# Patient Record
Sex: FEMALE | Race: WHITE | NOT HISPANIC OR LATINO | URBAN - METROPOLITAN AREA
[De-identification: names, ages, dates, MRNs, and addresses within clinical notes are randomized per-mention and may not be internally consistent; named-entity substitution may affect disease eponyms.]

---

## 2017-03-07 DIAGNOSIS — H53.40 VISUAL FIELD DEFECT: ICD-10-CM

## 2017-03-07 DIAGNOSIS — H47.10 PAPILLEDEMA: ICD-10-CM

## 2017-03-08 ENCOUNTER — OFFICE VISIT (OUTPATIENT)
Dept: OPHTHALMOLOGY | Facility: CLINIC | Age: 37
End: 2017-03-08
Attending: OPHTHALMOLOGY
Payer: COMMERCIAL

## 2017-03-08 DIAGNOSIS — G93.2 IDIOPATHIC INTRACRANIAL HYPERTENSION: Primary | ICD-10-CM

## 2017-03-08 DIAGNOSIS — H47.10 PAPILLEDEMA: ICD-10-CM

## 2017-03-08 PROCEDURE — 92083 EXTENDED VISUAL FIELD XM: CPT | Mod: ZF | Performed by: OPHTHALMOLOGY

## 2017-03-08 PROCEDURE — 99212 OFFICE O/P EST SF 10 MIN: CPT | Mod: ZF

## 2017-03-08 ASSESSMENT — VISUAL ACUITY
OS_CC: 20/20-
METHOD: SNELLEN - LINEAR
CORRECTION_TYPE: GLASSES
OD_CC: 20/20-

## 2017-03-08 ASSESSMENT — REFRACTION_WEARINGRX
OS_SPHERE: -5.50
OD_CYLINDER: +1.25
OS_AXIS: 100
OD_SPHERE: -3.75
OD_AXIS: 080
OS_CYLINDER: +2.50

## 2017-03-08 ASSESSMENT — EXTERNAL EXAM - RIGHT EYE: OD_EXAM: NORMAL

## 2017-03-08 ASSESSMENT — CONF VISUAL FIELD
OD_NORMAL: 1
OS_NORMAL: 1

## 2017-03-08 ASSESSMENT — EXTERNAL EXAM - LEFT EYE: OS_EXAM: NORMAL

## 2017-03-08 ASSESSMENT — SLIT LAMP EXAM - LIDS
COMMENTS: NORMAL
COMMENTS: NORMAL

## 2017-03-08 ASSESSMENT — TONOMETRY
OS_IOP_MMHG: 12
OD_IOP_MMHG: 15
IOP_METHOD: ICARE

## 2017-03-08 NOTE — NURSING NOTE
Chief Complaints and History of Present Illnesses   Patient presents with     Follow Up For     Pseudotumor cerebri with moderate optic disc edema     HPI    Affected eye(s):  Both   Symptoms:     No decreased vision      Frequency:  Constant       Do you have eye pain now?:  No      Comments:  No VA changes or new complaints today. Diamox as directed.     Licha LONG March 8, 2017 3:20 PM

## 2017-03-08 NOTE — MR AVS SNAPSHOT
After Visit Summary   3/8/2017    Cassia Mckeon    MRN: [id number]           Patient Information     Date Of Birth          1980        Visit Information        Provider Department      3/8/2017 3:00 PM Rusty Htuchins MD [facility]        Today's Diagnoses     Papilledema        Visual field defect           Follow-ups after your visit        Your next 10 appointments already scheduled     Aug 16, 2017  3:30 PM CDT   RETURN NEURO with Rusty Hutchins MD   Eye Clinic ([facility])    [address]  [address]  [address]  [facility] [address]   [phone]              Who to contact     Please call your clinic at [phone] to:    Ask questions about your health    Make or cancel appointments    Discuss your medicines    Learn about your test results    Speak to your doctor   If you have compliments or concerns about an experience at your clinic, or if you wish to file a complaint, please contact [facility] Patient Relations at [phone] or email us at [email or web address]         Additional Information About Your Visit        MyChart Information     OrangeS[employer]at gives you secure access to your electronic health record. If you see a primary care provider, you can also send messages to your care team and make appointments. If you have questions, please call your primary care clinic.  If you do not have a primary care provider, please call [phone] and they will assist you.      Oh[employer]apps is an electronic gateway that provides easy, online access to your medical records. With Oh[employer]apps, you can request a clinic appointment, read your test results, renew a prescription or communicate with your care team.     To access your existing account, please contact your [facility] or call [phone] for assistance.        Care EveryWhere ID     This is your Care EveryWhere ID. This  could be used by other organizations to access your [place] medical records  [id number]         Blood Pressure from Last 3 Encounters:   No data found for BP    Weight from Last 3 Encounters:   No data found for Wt              We Performed the Following     DILATED FUNDUS EXAM     Glaucoma Top OU     IOP Measurement        Primary Care Provider Office Phone # Fax #    Julian Meyer [phone]       [facility] MED  [address]  [facility] [address]        Thank you!     Thank you for choosing EYE CLINIC  for your care. Our goal is always to provide you with excellent care. Hearing back from our patients is one way we can continue to improve our services. Please take a few minutes to complete the written survey that you may receive in the mail after your visit with us. Thank you!             Your Updated Medication List - Protect others around you: Learn how to safely use, store and throw away your medicines at www.disposemymeds.org.          This list is accurate as of: 3/8/17  4:02 PM.  Always use your most recent med list.                   Brand Name Dispense Instructions for use    acetaZOLAMIDE 500 MG 12 hr capsule    DIAMOX SEQUEL     Take 500 mg by mouth 2 times daily       hydrochlorothiazide 25 MG tablet    HYDRODIURIL     Take 25 mg by mouth daily       norgestrel-ethinyl estradiol 0.3-30 MG-MCG per tablet    LO/OVRAL     Take 1 tablet by mouth daily       VITAMIN D3 PO      Take by mouth daily

## 2017-03-15 NOTE — PROGRESS NOTES
ATTENDING ASSESSMENT AND PLAN:     1. Pseudotumor cerebri:    - For a more thorough description of the disease presentation, please see   my letter from the patient's initial visit with me 1/7/16    - Symptoms of increased intracranial pressure has essentially resolved- headaches dramatically improved  - Continue weight loss (patient lost 50 lbs total now since diagnosis - beyond goal of 15%).  She is doing great in this regard.    -  Visual acuity and color vision normal in both eyes   -  Visual fields essentially full in both eyes today  -  Trace residual optic disc edema in both eyes versus remodeling changes  -  Stop Diamox    Return to clinic in 4 months       Complete documentation of historical and exam elements from today's encounter can be found in the full encounter summary report (not reduplicated in this progress note).  I personally obtained the chief complaint(s) and history of present illness.  I confirmed and edited as necessary the review of systems, past medical/surgical history, family history, social history, and examination findings as documented by others; and I examined the patient myself.  I personally reviewed the relevant tests, images, and reports as documented above.  I formulated and edited as necessary the assessment and plan and discussed the findings and management plan with the patient and family   Rusty Hutchins MD  8:20 PM  3/14/2017

## 2017-09-14 DIAGNOSIS — H47.10 PAPILLEDEMA: Primary | ICD-10-CM

## 2017-10-12 ENCOUNTER — OFFICE VISIT (OUTPATIENT)
Dept: OPHTHALMOLOGY | Facility: CLINIC | Age: 37
End: 2017-10-12
Attending: OPHTHALMOLOGY
Payer: COMMERCIAL

## 2017-10-12 DIAGNOSIS — G93.2 PSEUDOTUMOR CEREBRI: ICD-10-CM

## 2017-10-12 DIAGNOSIS — H47.11 PAPILLEDEMA ASSOCIATED WITH INCREASED INTRACRANIAL PRESSURE: Primary | ICD-10-CM

## 2017-10-12 PROCEDURE — 99212 OFFICE O/P EST SF 10 MIN: CPT | Mod: ZF

## 2017-10-12 RX ORDER — LISINOPRIL 10 MG/1
10 TABLET ORAL DAILY
COMMUNITY
Start: 2017-04-06

## 2017-10-12 ASSESSMENT — SLIT LAMP EXAM - LIDS
COMMENTS: NORMAL
COMMENTS: NORMAL

## 2017-10-12 ASSESSMENT — VISUAL ACUITY
METHOD: SNELLEN - LINEAR
OS_CC: 20/20
OD_CC: 20/15-2
OS_CC: 20/20
CORRECTION_TYPE: GLASSES
OD_CC: 20/20

## 2017-10-12 ASSESSMENT — EXTERNAL EXAM - RIGHT EYE: OD_EXAM: NORMAL

## 2017-10-12 ASSESSMENT — EXTERNAL EXAM - LEFT EYE: OS_EXAM: NORMAL

## 2017-10-12 ASSESSMENT — TONOMETRY
OS_IOP_MMHG: 16
OD_IOP_MMHG: 20
IOP_METHOD: TONOPEN

## 2017-10-12 NOTE — NURSING NOTE
Chief Complaints and History of Present Illnesses   Patient presents with     Papilledema Follow Up     HPI    Informant(s):  patient   Symptoms:     No blurred vision   No decreased vision   No distorted vision   No difficulty with reading   No difficulty watching television   No difficulty with driving   Able to meet visual needs of job   No double vision   No puffy eyes   No floaters   No flashes   No glare   No halos   No starbursts   No ghost images   No wandering eye   No crossed eye   No redness   No foreign body sensation   No tearing   No Dryness   No itching   No burning   Photophobia   No eye discharge      Frequency:  Weekly Other:  1 once a week - 1 every two weeks         Comments:  Slight increase in headaches (number of headaches); Better than what it was but slight increase in frequency compared to last visit; Feels that headaches are typically later in the day. Typically feel the headaches behind the eyes. Slight light sensitivity.   Weekly headaches; About once a week to once every two weeks, not daily.   Patient will manage headaches with ibuprofen

## 2017-10-12 NOTE — PROGRESS NOTES
ATTENDING ASSESSMENT AND PLAN:     1. Pseudotumor cerebri:    - For a more thorough description of the disease presentation, please see my letter from the patient's initial visit with me 1/7/16    - Symptoms of increased intracranial pressure had essentially resolved at last visit 3/8/17. Diamox was stopped at that time  - Since 8/2017, she notes increased frequency of headaches. The headaches occur about once a week, localize behind the eyes, and resolve within a few hours. Pulsatile tinnitus occurs 3-4 times per day and lasts only a few seconds. She denies TVOs  - Previously, she had significant weight loss (had lost 50 lbs since diagnosis), but she reports gaining over 15 lbs since 3/2017     -  Visual acuity and color vision normal in both eyes   - OCT retinal nerve fiber layer stable in both eyes  -  Trace residual optic disc edema in both eyes versus remodeling changes  -  We discussed restarting diamox versus monitoring with emphasis on weight loss. Given that she has no vision loss and is not particularly bothered by the headaches or pulsatile tinnitus, she decided to monitor without treatment.  She will place emphasis on weight loss.    Return to clinic in 4 months or sooner if necessary for visual changes, worsening pulsatile tinnitus / headaches.       Complete documentation of historical and exam elements from today's encounter can be found in the full encounter summary report (not reduplicated in this progress note).  I personally obtained the chief complaint(s) and history of present illness.  I confirmed and edited as necessary the review of systems, past medical/surgical history, family history, social history, and examination findings as documented by others; and I examined the patient myself.  I personally reviewed the relevant tests, images, and reports as documented above.  I formulated and edited as necessary the assessment and plan and discussed the findings and management plan with the patient  and family   Rusty Hutchins MD  8:20 PM  3/14/2017

## 2017-10-12 NOTE — MR AVS SNAPSHOT
After Visit Summary   10/12/2017    Cassia Mckeon    MRN: [id number]           Patient Information     Date Of Birth          1980        Visit Information        Provider Department      10/12/2017 3:00 PM Rusty Hutchins MD [facility]        Today's Diagnoses     Papilledema associated with increased intracranial pressure    -  1       Follow-ups after your visit        Your next 10 appointments already scheduled     Feb 22, 2018  3:00 PM CST   RETURN NEURO with Rusty Hutchins MD   Eye Clinic ([facility])    [address]  [address]  [address]  [facility] [address]   [phone]              Who to contact     Please call your clinic at [phone] to:    Ask questions about your health    Make or cancel appointments    Discuss your medicines    Learn about your test results    Speak to your doctor   If you have compliments or concerns about an experience at your clinic, or if you wish to file a complaint, please contact [facility] Patient Relations at [phone] or email us at [email or web address]         Additional Information About Your Visit        MyChart Information     S[employer] gives you secure access to your electronic health record. If you see a primary care provider, you can also send messages to your care team and make appointments. If you have questions, please call your primary care clinic.  If you do not have a primary care provider, please call [phone] and they will assist you.      S[employer]iet is an electronic gateway that provides easy, online access to your medical records. With [employer], you can request a clinic appointment, read your test results, renew a prescription or communicate with your care team.     To access your existing account, please contact your [facility] or call [phone] for assistance.        Care EveryWhere ID     This is  your Care EveryWhere ID. This could be used by other organizations to access your [place] medical records  [id number]         Blood Pressure from Last 3 Encounters:   No data found for BP    Weight from Last 3 Encounters:   No data found for Wt              Today, you had the following     No orders found for display       Primary Care Provider Office Phone # Fax #    Julian Meyer [phone]       [facility]  [address]  [facility] [address]        Equal Access to Services     ANGEL PEREZ : Hadii aad ku hadasho Soomaali, waaxda luqadaha, qaybta kaalmada adeegyada, waxay idiin hayaan adeeg shahanacatahugo la'jose . So [facility] [place] [facility] [phone].    ATENCIÓN: Si habla español, tiene a gaines disposición servicios gratuitos de asistencia lingüística. [facility] [phone].    We comply with applicable federal civil rights laws and [place] laws. We do not discriminate on the basis of race, color, national origin, age, disability, sex, sexual orientation, or gender identity.            Thank you!     Thank you for choosing EYE CLINIC  for your care. Our goal is always to provide you with excellent care. Hearing back from our patients is one way we can continue to improve our services. Please take a few minutes to complete the written survey that you may receive in the mail after your visit with us. Thank you!             Your Updated Medication List - Protect others around you: Learn how to safely use, store and throw away your medicines at www.disposemymeds.org.          This list is accurate as of: 10/12/17  4:42 PM.  Always use your most recent med list.                   Brand Name Dispense Instructions for use Diagnosis    lisinopril 10 MG tablet    PRINIVIL/ZESTRIL     Take 10 mg by mouth daily        norgestrel-ethinyl estradiol 0.3-30 MG-MCG per tablet    LO/OVRAL     Take 1 tablet by mouth daily        VITAMIN D3 PO      Take by mouth daily

## 2017-11-02 DIAGNOSIS — G93.2 PSEUDOTUMOR CEREBRI: Primary | ICD-10-CM

## 2017-11-02 RX ORDER — ACETAZOLAMIDE 500 MG/1
1000 CAPSULE, EXTENDED RELEASE ORAL 2 TIMES DAILY
Qty: 120 CAPSULE | Refills: 9 | Status: SHIPPED | OUTPATIENT
Start: 2017-11-02 | End: 2021-09-29

## 2017-12-31 ENCOUNTER — HEALTH MAINTENANCE LETTER (OUTPATIENT)
Age: 37
End: 2017-12-31

## 2018-04-02 DIAGNOSIS — H47.10 PAPILLEDEMA: Primary | ICD-10-CM

## 2018-04-04 ENCOUNTER — OFFICE VISIT (OUTPATIENT)
Dept: OPHTHALMOLOGY | Facility: CLINIC | Age: 38
End: 2018-04-04
Attending: OPHTHALMOLOGY
Payer: COMMERCIAL

## 2018-04-04 DIAGNOSIS — H47.10 PAPILLEDEMA: ICD-10-CM

## 2018-04-04 PROCEDURE — G0463 HOSPITAL OUTPT CLINIC VISIT: HCPCS | Mod: ZF | Performed by: TECHNICIAN/TECHNOLOGIST

## 2018-04-04 PROCEDURE — 92133 CPTRZD OPH DX IMG PST SGM ON: CPT | Mod: ZF | Performed by: OPHTHALMOLOGY

## 2018-04-04 ASSESSMENT — SLIT LAMP EXAM - LIDS
COMMENTS: NORMAL
COMMENTS: NORMAL

## 2018-04-04 ASSESSMENT — EXTERNAL EXAM - LEFT EYE: OS_EXAM: NORMAL

## 2018-04-04 ASSESSMENT — EXTERNAL EXAM - RIGHT EYE: OD_EXAM: NORMAL

## 2018-04-04 ASSESSMENT — CONF VISUAL FIELD
METHOD: COUNTING FINGERS
OS_NORMAL: 1
OD_NORMAL: 1

## 2018-04-04 ASSESSMENT — VISUAL ACUITY
METHOD: SNELLEN - LINEAR
OD_CC: 20/20
CORRECTION_TYPE: GLASSES
OS_CC+: -1
OS_CC: 20/20

## 2018-04-04 ASSESSMENT — TONOMETRY
OS_IOP_MMHG: 13
OD_IOP_MMHG: 15
IOP_METHOD: ICARE

## 2018-04-04 NOTE — MR AVS SNAPSHOT
After Visit Summary   4/4/2018    Cassia Mckeon    MRN: [id number]           Patient Information     Date Of Birth          1980        Visit Information        Provider Department      4/4/2018 3:30 PM Rusty uHtchins MD [facility] Clinic        Today's Diagnoses     Papilledema           Follow-ups after your visit        Your next 10 appointments already scheduled     Apr 04, 2018  3:30 PM CDT   RETURN NEURO with Rusty Hutchins MD   Eye Clinic ([facility])    [address] [address]   [phone]            Oct 10, 2018  2:30 PM CDT   RETURN NEURO with Rusty Hutchins MD   Eye Clinic ([facility])    [address] [address]   [phone]              Who to contact     Please call your clinic at [phone] to:    Ask questions about your health    Make or cancel appointments    Discuss your medicines    Learn about your test results    Speak to your doctor            Additional Information About Your Visit        MyChart Information     [employer] gives you secure access to your electronic health record. If you see a primary care provider, you can also send messages to your care team and make appointments. If you have questions, please call your primary care clinic.  If you do not have a primary care provider, please call [phone] and they will assist you.      Prim[employer]ista is an electronic gateway that provides easy, online access to your medical records. With [employer], you can request a clinic appointment, read your test results, renew a prescription or communicate with your care team.     To access your existing account, please contact your [facility] or call [phone] for assistance.        Care EveryWhere ID     This is your Care EveryWhere ID. This could be used by other  organizations to access your [place] medical records  [id number]         Blood Pressure from Last 3 Encounters:   No data found for BP    Weight from Last 3 Encounters:   No data found for Wt              We Performed the Following     DILATED FUNDUS EXAM     IOP Measurement     OCT Optic Nerve RNFL Spectralis OU (both eyes)        Primary Care Provider Office Phone # Fax #    Julian Meyer [phone]       [facility] MED  [address]  [facility] [address]        Equal Access to Services     ANGEL PEREZ : Hadii aad ku hadasho Soomaali, waaxda luqadaha, qaybta kaalmada adeegyada, waxay idiin hayaan adeeg kharash lacamila . So [facility] [phone].    ATENCIÓN: Si juana lyon, tiene a gaines disposición servicios gratuitos de asistencia lingüística. [facility] [phone].    We comply with applicable federal civil rights laws and [place] laws. We do not discriminate on the basis of race, color, national origin, age, disability, sex, sexual orientation, or gender identity.            Thank you!     Thank you for choosing EYE CLINIC  for your care. Our goal is always to provide you with excellent care. Hearing back from our patients is one way we can continue to improve our services. Please take a few minutes to complete the written survey that you may receive in the mail after your visit with us. Thank you!             Your Updated Medication List - Protect others around you: Learn how to safely use, store and throw away your medicines at www.disposemymeds.org.          This list is accurate as of 4/4/18  3:08 PM.  Always use your most recent med list.                   Brand Name Dispense Instructions for use Diagnosis    acetaZOLAMIDE 500 MG 12 hr capsule    DIAMOX SEQUEL    120 capsule    Take 2 capsules (1,000 mg) by mouth 2 times daily    Pseudotumor cerebri       lisinopril 10 MG tablet    PRINIVIL/ZESTRIL     Take 10 mg by mouth daily        norgestrel-ethinyl estradiol 0.3-30  MG-MCG per tablet    LO/OVRAL     Take 1 tablet by mouth daily        VITAMIN D3 PO      Take by mouth daily

## 2018-04-04 NOTE — NURSING NOTE
Chief Complaints and History of Present Illnesses   Patient presents with     Follow Up For     IIH     HPI    Symptoms:              Comments:  6 months follow up for Pseudotumor cerebri.    Patient currently on Diamox, 500 mg BID.  Have not had much headaches per patient.   Stable vision per patient.   Patient lost about 11 lbs since last eye exam.   EMERALD Anne 4/4/2018 2:37 PM

## 2018-04-05 NOTE — PROGRESS NOTES
1. Pseudotumor cerebri (very mild versus inactive currently)    - For a more thorough description of the disease presentation, please see my letter from the patient's initial visit with me 1/7/16    -Patient has not had any symptoms of increased intracranial pressure since last visit (October 2017).  At that time she was having more headaches which prompted us to put her back on Diamox 500 twice a day.    -Today she denies any significant headaches.  She reports that she has lost about 11 pounds since her last eye examination.    -Visual acuity and color vision are normal in both eyes today.    -Dilated fundus examination shows mild nasal elevation in both eyes with possible trace active edema versus chronic gliotic changes of the nerve head.     OCT of the optic nerve head today revealed stable retinal nerve fiber layer mild atrophy in the left eye.  Slightly decreased retinal nerve fiber layer in the right eye probably indicating mild improvement in papilledema since last visit.    In conclusion this patient no longer has symptoms of increased intracranial pressure.  She has no significant vision loss from her disease.  She either has inactive pseudotumor cerebri or very mild papilledema.  She has lost considerable weight since the time of diagnosis.    After discussions we have mutually opted to stop her Diamox.  We will watch her carefully.  If she has a recurrence of symptoms of increased intracranial pressure then we may need to restart.    Follow-up in 6 months or sooner as needed.         Complete documentation of historical and exam elements from today's encounter can be found in the full encounter summary report (not reduplicated in this progress note).  I personally obtained the chief complaint(s) and history of present illness.  I confirmed and edited as necessary the review of systems, past medical/surgical history, family history, social history, and examination findings as documented by others;  and I examined the patient myself.  I personally reviewed the relevant tests, images, and reports as documented above.  I formulated and edited as necessary the assessment and plan and discussed the findings and management plan with the patient and family     Rusty Hutchins MD

## 2020-03-10 ENCOUNTER — HEALTH MAINTENANCE LETTER (OUTPATIENT)
Age: 40
End: 2020-03-10

## 2020-04-01 ENCOUNTER — TELEPHONE (OUTPATIENT)
Dept: OPHTHALMOLOGY | Facility: CLINIC | Age: 40
End: 2020-04-01

## 2020-12-27 ENCOUNTER — HEALTH MAINTENANCE LETTER (OUTPATIENT)
Age: 40
End: 2020-12-27

## 2021-04-24 ENCOUNTER — HEALTH MAINTENANCE LETTER (OUTPATIENT)
Age: 41
End: 2021-04-24

## 2021-08-23 ENCOUNTER — MYC MEDICAL ADVICE (OUTPATIENT)
Dept: OPHTHALMOLOGY | Facility: CLINIC | Age: 41
End: 2021-08-23

## 2021-08-24 ENCOUNTER — TRANSCRIBE ORDERS (OUTPATIENT)
Dept: OTHER | Age: 41
End: 2021-08-24

## 2021-08-24 DIAGNOSIS — R51.9 NONINTRACTABLE HEADACHE, UNSPECIFIED CHRONICITY PATTERN, UNSPECIFIED HEADACHE TYPE: ICD-10-CM

## 2021-08-24 DIAGNOSIS — H53.9 VISION CHANGES: Primary | ICD-10-CM

## 2021-09-07 NOTE — TELEPHONE ENCOUNTER
Confirmed appt is on September 29th at 1:30 PM with Dr. Hernandez-- I likely had typo in [employer] message.    Pt seemed satisfied with information and seemed comfortable with scheduling    Orlando Friedman RN 4:39 PM 09/07/21

## 2021-09-29 ENCOUNTER — MYC MEDICAL ADVICE (OUTPATIENT)
Dept: OPHTHALMOLOGY | Facility: CLINIC | Age: 41
End: 2021-09-29

## 2021-09-29 ENCOUNTER — OFFICE VISIT (OUTPATIENT)
Dept: OPHTHALMOLOGY | Facility: CLINIC | Age: 41
End: 2021-09-29
Attending: OPHTHALMOLOGY
Payer: COMMERCIAL

## 2021-09-29 DIAGNOSIS — H47.10 PAPILLEDEMA: Primary | ICD-10-CM

## 2021-09-29 DIAGNOSIS — R51.9 NONINTRACTABLE HEADACHE, UNSPECIFIED CHRONICITY PATTERN, UNSPECIFIED HEADACHE TYPE: ICD-10-CM

## 2021-09-29 DIAGNOSIS — H53.9 VISION CHANGES: ICD-10-CM

## 2021-09-29 PROBLEM — I10 HYPERTENSION: Status: ACTIVE | Noted: 2021-09-29

## 2021-09-29 PROBLEM — E66.01 MORBID OBESITY (H): Status: ACTIVE | Noted: 2021-09-29

## 2021-09-29 PROBLEM — E55.9 VITAMIN D DEFICIENCY: Status: ACTIVE | Noted: 2021-09-29

## 2021-09-29 PROCEDURE — G0463 HOSPITAL OUTPT CLINIC VISIT: HCPCS

## 2021-09-29 PROCEDURE — 92004 COMPRE OPH EXAM NEW PT 1/>: CPT | Mod: GC | Performed by: OPHTHALMOLOGY

## 2021-09-29 PROCEDURE — 92083 EXTENDED VISUAL FIELD XM: CPT | Performed by: OPHTHALMOLOGY

## 2021-09-29 PROCEDURE — 92133 CPTRZD OPH DX IMG PST SGM ON: CPT | Performed by: OPHTHALMOLOGY

## 2021-09-29 RX ORDER — CYCLOBENZAPRINE HCL 5 MG
5 TABLET ORAL PRN
COMMUNITY
Start: 2021-09-02

## 2021-09-29 RX ORDER — ACETAZOLAMIDE 500 MG/1
1000 CAPSULE, EXTENDED RELEASE ORAL 2 TIMES DAILY
Qty: 120 CAPSULE | Refills: 4 | Status: SHIPPED | OUTPATIENT
Start: 2021-09-29 | End: 2022-03-03

## 2021-09-29 RX ORDER — DOXYCYCLINE 40 MG/1
CAPSULE ORAL
COMMUNITY
Start: 2020-06-25 | End: 2021-12-01

## 2021-09-29 ASSESSMENT — REFRACTION_WEARINGRX
OS_CYLINDER: +2.50
OD_SPHERE: -3.75
OD_CYLINDER: +1.25
OS_AXIS: 100
OD_AXIS: 080
OS_SPHERE: -5.50

## 2021-09-29 ASSESSMENT — TONOMETRY
OS_IOP_MMHG: 12
IOP_METHOD: ICARE
OD_IOP_MMHG: 16

## 2021-09-29 ASSESSMENT — VISUAL ACUITY
OD_SC: 20/20
METHOD: SNELLEN - LINEAR
OS_SC: 20/20

## 2021-09-29 ASSESSMENT — EXTERNAL EXAM - RIGHT EYE: OD_EXAM: NORMAL

## 2021-09-29 ASSESSMENT — SLIT LAMP EXAM - LIDS
COMMENTS: NORMAL
COMMENTS: NORMAL

## 2021-09-29 ASSESSMENT — CONF VISUAL FIELD
OD_NORMAL: 1
OS_NORMAL: 1

## 2021-09-29 ASSESSMENT — EXTERNAL EXAM - LEFT EYE: OS_EXAM: NORMAL

## 2021-09-29 NOTE — LETTER
2021    RE: Cassia Mckeon  : 1980  MRN: [id number]    Dear Providers,    I saw our mutual patient, Cassia Mckeon, in follow-up in my clinic recently.  After a thorough neuro-ophthalmic history and examination, I came to the following conclusions:     1. Pseudotumor cerebri with recurrence of symptoms.  Risk factors for recurrence include doxycycline for blepharitis over the past 18 months as well as a self-reported weight gain of 30 pounds in the past 3 years.  Today with mild to moderate disc edema without visual deficits.     Will resume diamox 1000mg BID as the patient tolerated acetazolamide well in the past and recommend stopping the doxycycline and not resuming high risk medications associated with psuedotumor cerebri.     Current weight 260 lbs.      Return to clinic in 2 months or sooner as needed for worsening symptoms of vision or worsening headaches    INTERVAL HISTORY Last seen 2018    Cassia Mckeon is back today with recurrence of symptoms concerning for IIH.  She has had recurrence of her pulsatile tinnitus that she first noticed this summer. She notices this every once and a while. The frequency and intensity has been stable since recurrence. She also feels pressure in her head, and this is also similar to before. Has also had recurrence of headaches that also feel similar to her original presentation. These are worse later on in the day. These are happening a few times a week.  These headaches are improved with sleeping or napping. With regard to her vision, she feels is seeing black circles in her vision when she goes up stairs or with increased exertional activity.     With regard to identifying triggers, she has gained about 30lbs in the past three years and for rosacea was started on Minocycline about 18 months ago. This she tried for about 2 weeks before she felt like her pressure symptoms at that time were returning so she stopped and switched to daily  doxycycline 50 mg. She is still taking the daily doxycycline, she feels that it is helping her flare ups, though has only had a mild improvement in her skin redness.     She was last seen in 2018, she had very mild IIH and managed to be taken off diamox dosing with directions to return in 6 months however patent did not follow up. She does live in [facility] [facility].     Currently not taking any caffeine.      Visual acuity with correction was 20/20 in the right eye and 20/20 in the left eye. IOP was 16 in the right eye and 12 in the left eye. Confrontational Visual fields were full in both eyes. Ocular motility was full in all gaze directions. Color plates were 11/11 in the right eye and 11/11 in the left eye.  Pupils were equal, round and reactive to light with no RAPD.     Dilated fundus exam: Increase in disc edema from prior visit, Grade 1-2 right eye grade 1 left eye.     Tests ordered and interpreted today:  OCT rNFL demonstrated a mean NFL thickness of 100 in the right eye and 85 in the left eye. Thickness profile demonstrated diffuse thickening in the right eye and mild thickening in the left eye.    VF: Octopus visual field was performed. Test was reliable.. Right eye with mild scattered deficits.. Left eye with mild scattered deficits.      For further exam details, please feel free to contact our office for additional records.  If you wish to contact me regarding this patient please email me at [email or web address] or give my clinic a call to arrange a phone conversation.    Sincerely,    Rusty Hutchins MD  , Neuro-Ophthalmology and Adult Strabismus Surgery  The San[facility] Hand Chair in Neuro-Ophthalmology  Department of Ophthalmology and Visual Neurosciences  [facility]

## 2021-09-29 NOTE — PROGRESS NOTES
1. Pseudotumor cerebri with recurrence of symptoms.  Risk factors for recurrence include doxycycline for blepharitis over the past 18 months as well as a self-reported weight gain of 30 pounds in the past 3 years.  Today with mild to moderate disc edema without visual deficits.     Will resume diamox 1000mg BID as the patient tolerated acetazolamide well in the past and recommend stopping the doxycycline and not resuming high risk medications associated with psuedotumor cerebri.     Current weight 260 lbs.      Return to clinic in 2 months or sooner as needed for worsening symptoms of vision or worsening headaches    INTERVAL HISTORY Last seen 4/4/2018    Cassia Mckeon is back today with recurrence of symptoms concerning for IIH.  She has had recurrence of her pulsatile tinnitus that she first noticed this summer. She notices this every once and a while. The frequency and intensity has been stable since recurrence. She also feels pressure in her head, and this is also similar to before. Has also had recurrence of headaches that also feel similar to her original presentation. These are worse later on in the day. These are happening a few times a week.  These headaches are improved with sleeping or napping. With regard to her vision, she feels is seeing black circles in her vision when she goes up stairs or with increased exertional activity.     With regard to identifying triggers, she has gained about 30lbs in the past three years and for rosacea was started on Minocycline about 18 months ago. This she tried for about 2 weeks before she felt like her pressure symptoms at that time were returning so she stopped and switched to daily doxycycline 50 mg. She is still taking the daily doxycycline, she feels that it is helping her flare ups, though has only had a mild improvement in her skin redness.     She was last seen in 2018, she had very mild IIH and managed to be taken off diamox dosing with directions to  return in 6 months however patent did not follow up. She does live in [facility].     Currently not taking any caffeine.      Visual acuity with correction was 20/20 in the right eye and 20/20 in the left eye. IOP was 16 in the right eye and 12 in the left eye. Confrontational Visual fields were full in both eyes. Ocular motility was full in all gaze directions. Color plates were 11/11 in the right eye and 11/11 in the left eye.  Pupils were equal, round and reactive to light with no RAPD.     Dilated fundus exam: Increase in disc edema from prior visit, Grade 1-2 right eye grade 1 left eye.     Tests ordered and interpreted today:  OCT rNFL demonstrated a mean NFL thickness of 100 in the right eye and 85 in the left eye. Thickness profile demonstrated diffuse thickening in the right eye and mild thickening in the left eye.    VF: Octopus visual field was performed. Test was reliable.. Right eye with mild scattered deficits.. Left eye with mild scattered deficits.     Complete documentation of historical and exam elements from today's encounter can be found in the full encounter summary report (not reduplicated in this progress note).  I personally obtained the chief complaint(s) and history of present illness.  I confirmed and edited as necessary the review of systems, past medical/surgical history, family history, social history, and examination findings as documented by others; and I examined the patient myself.  I personally reviewed the relevant tests, images, and reports as documented above.  I formulated and edited as necessary the assessment and plan and discussed the findings and management plan with the patient and family.  I personally reviewed the ophthalmic test(s) associated with this encounter, agree with the interpretation(s) as documented by the resident/fellow, and have edited the corresponding report(s) as necessary.     MD Cesilia Escobar MD (PGY3 ophthalmology  resident)

## 2021-09-29 NOTE — NURSING NOTE
Chief Complaints and History of Present Illnesses   Patient presents with     Blurred Vision Follow-Up     Chief Complaint(s) and History of Present Illness(es)     Blurred Vision Follow-Up               Comments     Casisa Mckeon is a 40 year old female who presents today for  1. Pseudotumor cerebri (very mild versus inactive currently)  Patient is complaining of tinnitus and headaches. Sees black spots when going up the stairs, and episodes of dizziness.   No diplopia    Santiago CORBIN 1:33 PM September 29, 2021

## 2021-09-30 DIAGNOSIS — H47.10 PAPILLEDEMA: Primary | ICD-10-CM

## 2021-09-30 NOTE — PROGRESS NOTES
Spoke to patient at 5:38 PM. She stated that she was able to have the original pharmacy send the prescription to the new pharmacy of her choice. Patient did not have any additional questions or concerns.     Jesica King MD  Resident Physician, PGY-2  Department of Ophthalmology  09/30/21 5:38 PM

## 2021-10-04 ENCOUNTER — HEALTH MAINTENANCE LETTER (OUTPATIENT)
Age: 41
End: 2021-10-04

## 2021-12-01 ENCOUNTER — OFFICE VISIT (OUTPATIENT)
Dept: OPHTHALMOLOGY | Facility: CLINIC | Age: 41
End: 2021-12-01
Attending: OPHTHALMOLOGY
Payer: COMMERCIAL

## 2021-12-01 DIAGNOSIS — H47.10 PAPILLEDEMA: ICD-10-CM

## 2021-12-01 DIAGNOSIS — H47.10 PAPILLEDEMA: Primary | ICD-10-CM

## 2021-12-01 DIAGNOSIS — H47.20 PARTIAL OPTIC ATROPHY: ICD-10-CM

## 2021-12-01 PROCEDURE — 92133 CPTRZD OPH DX IMG PST SGM ON: CPT | Performed by: OPHTHALMOLOGY

## 2021-12-01 PROCEDURE — G0463 HOSPITAL OUTPT CLINIC VISIT: HCPCS

## 2021-12-01 PROCEDURE — 92014 COMPRE OPH EXAM EST PT 1/>: CPT | Mod: GC | Performed by: OPHTHALMOLOGY

## 2021-12-01 PROCEDURE — 92083 EXTENDED VISUAL FIELD XM: CPT | Performed by: OPHTHALMOLOGY

## 2021-12-01 ASSESSMENT — REFRACTION_WEARINGRX
OD_AXIS: 080
OD_CYLINDER: +1.25
SPECS_TYPE: SVL
OD_SPHERE: -3.75
OS_AXIS: 100
OS_CYLINDER: +2.50
OS_SPHERE: -5.50

## 2021-12-01 ASSESSMENT — SLIT LAMP EXAM - LIDS
COMMENTS: NORMAL
COMMENTS: NORMAL

## 2021-12-01 ASSESSMENT — VISUAL ACUITY
OS_CC: 20/20
CORRECTION_TYPE: GLASSES
OD_CC: 20/20
OS_CC+: -2
METHOD: SNELLEN - LINEAR
OD_CC+: -1

## 2021-12-01 ASSESSMENT — CONF VISUAL FIELD
OD_NORMAL: 1
METHOD: COUNTING FINGERS
OS_NORMAL: 1

## 2021-12-01 ASSESSMENT — TONOMETRY
IOP_METHOD: TONOPEN
OS_IOP_MMHG: 13
OD_IOP_MMHG: 12

## 2021-12-01 ASSESSMENT — EXTERNAL EXAM - LEFT EYE: OS_EXAM: NORMAL

## 2021-12-01 ASSESSMENT — EXTERNAL EXAM - RIGHT EYE: OD_EXAM: NORMAL

## 2021-12-01 NOTE — NURSING NOTE
Chief Complaints and History of Present Illnesses   Patient presents with     Papilledema Follow Up     Chief Complaint(s) and History of Present Illness(es)     Papilledema Follow Up     Laterality: both eyes    Frequency: constantly    Timing: throughout the day    Course: gradually improving    Associated symptoms: Negative for eye pain, tearing, burning, itching, redness and photophobia    Pain scale: 0/10              Comments     'Notes much less pressure sensation in my head.'  Diamox 1000 mg BID.    Nyla Cordoba, COT COT 12:20 PM 12/01/2021

## 2021-12-01 NOTE — PROGRESS NOTES
1. Pseudotumor cerebri - Recurrence of IIH noted in 9/2021 likely from a combination of weight gain and tetracycline derivative antibiotic use. She has noticed subjective improvement in her IIH related symptoms including headaches and pulsatile tinnitus. Tolerating Diamox well at current dose of 1000 mg BID.      Current weight 249 lbs down from 260 lbs at diagnosis. OCT RNFL is improved in the right eye and similar in the left eye. Right eye is currently 93 um. Previous jessika 4/2018 of 86.     Maintain dose at 1000 mg BID and follow-up in 3-4 months. Continue efforts at weight loss and avoid precipitating medications.    Historical Data from prior visits:  Cassia Mckeon is back today with recurrence of symptoms concerning for IIH.  She has had recurrence of her pulsatile tinnitus that she first noticed this summer. She notices this every once and a while. The frequency and intensity has been stable since recurrence. She also feels pressure in her head, and this is also similar to before. Has also had recurrence of headaches that also feel similar to her original presentation. These are worse later on in the day. These are happening a few times a week.  These headaches are improved with sleeping or napping. With regard to her vision, she feels is seeing black circles in her vision when she goes up stairs or with increased exertional activity.     With regard to identifying triggers, she has gained about 30lbs in the past three years and for rosacea was started on Minocycline about 18 months ago. This she tried for about 2 weeks before she felt like her pressure symptoms at that time were returning so she stopped and switched to daily doxycycline 50 mg. She is still taking the daily doxycycline, she feels that it is helping her flare ups, though has only had a mild improvement in her skin redness.     She was last seen in 2018, she had very mild IIH and managed to be taken off diamox dosing with directions to  return in 6 months however patent did not follow up. She does live in [facility].     Currently not taking any caffeine.      INTERVAL HISTORY Last seen 9/29/21  Since starting Diamox, she notes resolution of headaches and pulsatile tinnitus. Denies double vision, TVOs. She stopped doxycycline after last visit.     She has lost 10 lbs with weight watchers and portion control.   Taking Diamox 1000 mg twice a day without significant problem.     Exam:  Visual acuity with correction was 20/20 in the right eye and 20/20 in the left eye. IOP was 16 in the right eye and 12 in the left eye. Confrontational Visual fields were full in both eyes. Ocular motility was full in all gaze directions. Color plates were 11/11 in the right eye and 11/11 in the left eye.  Pupils were equal, round and reactive to light with no RAPD.     Dilated fundus exam: Improved that optic disc edema in the right eye and trace edema in the left eye    Tests ordered and interpreted today:  OCT rNFL demonstrated a mean NFL thickness of 93 in the right eye and 82 in the left eye. Thickness profile demonstrated diffuse thickening in the right eye and mild thickening in the left eye.  Retinal nerve fiber layer thickness was significant improved in the right eye since last visit and unchanged in the left eye    Octopus automated 30 degree visual field. Test was reliable..  Both visual fields showed mild scattered points of depression but were mostly full.  Both visual fields were improved from September 2021.     Complete documentation of historical and exam elements from today's encounter can be found in the full encounter summary report (not reduplicated in this progress note).  I personally obtained the chief complaint(s) and history of present illness.  I confirmed and edited as necessary the review of systems, past medical/surgical history, family history, social history, and examination findings as documented by others; and I examined the  patient myself.  I personally reviewed the relevant tests, images, and reports as documented above.  I formulated and edited as necessary the assessment and plan and discussed the findings and management plan with the patient and family.  I personally reviewed the ophthalmic test(s) associated with this encounter, agree with the interpretation(s) as documented by the resident/fellow, and have edited the corresponding report(s) as necessary.     MD Lavell Escobar, DO- Neuro-ophthalmology Fellow

## 2022-03-03 DIAGNOSIS — H47.10 PAPILLEDEMA: ICD-10-CM

## 2022-03-03 RX ORDER — ACETAZOLAMIDE 500 MG/1
1000 CAPSULE, EXTENDED RELEASE ORAL 2 TIMES DAILY
Qty: 120 CAPSULE | Refills: 4 | Status: SHIPPED | OUTPATIENT
Start: 2022-03-03 | End: 2022-10-13

## 2022-03-03 NOTE — TELEPHONE ENCOUNTER
acetaZOLAMIDE (DIAMOX SEQUEL) 500 MG 12 hr capsule  Last Written Prescription Date:   9/29/2021  Last Fill Quantity: 120,   # refills: 4  Last Office Visit : 12/1/2021  Future Office visit:  3/9/2022     Rusty Hutchins MD    Ophthalmology  Assessment & Plan         1. Pseudotumor cerebri - Recurrence of IIH noted in 9/2021 likely from a combination of weight gain and tetracycline derivative antibiotic use. She has noticed subjective improvement in her IIH related symptoms including headaches and pulsatile tinnitus. Tolerating Diamox well at current dose of 1000 mg BID.      Current weight 249 lbs down from 260 lbs at diagnosis. OCT RNFL is improved in the right eye and similar in the left eye. Right eye is currently 93 um. Previous jessika 4/2018 of 86.      Maintain dose at 1000 mg BID and follow-up in 3-4 months. Continue efforts at weight loss and avoid precipitating medications.    120 Capsules, 4 Refills sent to pharm 3/3/2022      Odessa Wright RN  Central Triage Red Flags/Med Refills        Warnings Override History for acetaZOLAMIDE (DIAMOX SEQUEL) 500 MG 12 hr capsule [[id number]]    Overridden by Rusty Hutchins MD on Sep 29, 2021 3:23 PM   Dose   1. ACETAZOLAMIDE, 1,000 MG, ORAL, 2 TIMES DAILY  DAILY DOSE 2,000 MG. OVERDOSE (MAX. 1,000 MG);   SINGLE DOSE 1,000 MG. OVERDOSE (MAX. 500 MG) [Reason: Benefit outweighs risk]

## 2022-03-09 ENCOUNTER — OFFICE VISIT (OUTPATIENT)
Dept: OPHTHALMOLOGY | Facility: CLINIC | Age: 42
End: 2022-03-09
Attending: OPHTHALMOLOGY
Payer: COMMERCIAL

## 2022-03-09 DIAGNOSIS — H47.10 PAPILLEDEMA: Primary | ICD-10-CM

## 2022-03-09 PROCEDURE — G0463 HOSPITAL OUTPT CLINIC VISIT: HCPCS | Performed by: TECHNICIAN/TECHNOLOGIST

## 2022-03-09 PROCEDURE — 92133 CPTRZD OPH DX IMG PST SGM ON: CPT | Performed by: OPHTHALMOLOGY

## 2022-03-09 PROCEDURE — 92014 COMPRE OPH EXAM EST PT 1/>: CPT | Performed by: OPHTHALMOLOGY

## 2022-03-09 ASSESSMENT — TONOMETRY
OS_IOP_MMHG: 15
OD_IOP_MMHG: 13
IOP_METHOD: ICARE

## 2022-03-09 ASSESSMENT — REFRACTION_WEARINGRX
SPECS_TYPE: SVL
OS_CYLINDER: +2.50
OD_CYLINDER: +1.25
OD_SPHERE: -3.75
OD_AXIS: 080
OS_AXIS: 100
OS_SPHERE: -5.50

## 2022-03-09 ASSESSMENT — CONF VISUAL FIELD
OD_NORMAL: 1
METHOD: COUNTING FINGERS
OS_NORMAL: 1

## 2022-03-09 ASSESSMENT — SLIT LAMP EXAM - LIDS
COMMENTS: NORMAL
COMMENTS: NORMAL

## 2022-03-09 ASSESSMENT — VISUAL ACUITY
OS_CC: 20/20
CORRECTION_TYPE: GLASSES
METHOD: SNELLEN - LINEAR
OD_CC: 20/20

## 2022-03-09 ASSESSMENT — EXTERNAL EXAM - RIGHT EYE: OD_EXAM: NORMAL

## 2022-03-09 ASSESSMENT — EXTERNAL EXAM - LEFT EYE: OS_EXAM: NORMAL

## 2022-03-09 NOTE — PROGRESS NOTES
1. Pseudotumor cerebri - Recurrence of IIH noted in 9/2021 likely from a combination of weight gain and tetracycline derivative antibiotic use. She has noticed subjective improvement in her IIH related symptoms including headaches and pulsatile tinnitus. Tolerating Diamox well at current dose of 1000 mg BID.      Current weight 229 lbs down from 260 lbs at diagnosis. OCT RNFL is improved again in the right eye and similar in the left eye.  Right eye retinal nerve fiber layer average approaching jessika 4/2018 of 86.     Plan: Decrease Diamox to 500 mg twice a day.     Historical Data from prior visits:  Cassia Mckeon is back today with recurrence of symptoms concerning for   IIH.  She has had recurrence of her pulsatile tinnitus that she first noticed   this summer. She notices this every once and a while. The frequency and   intensity has been stable since recurrence. She also feels pressure in her   head, and this is also similar to before. Has also had recurrence of   headaches that also feel similar to her original presentation. These are   worse later on in the day. These are happening a few times a week.  These   headaches are improved with sleeping or napping. With regard to her   vision, she feels is seeing black circles in her vision when she goes up   stairs or with increased exertional activity.     With regard to identifying triggers, she has gained about 30lbs in the   past three years and for rosacea was started on Minocycline about 18   months ago. This she tried for about 2 weeks before she felt like her   pressure symptoms at that time were returning so she stopped and switched   to daily doxycycline 50 mg. She is still taking the daily doxycycline, she   feels that it is helping her flare ups, though has only had a mild   improvement in her skin redness.     She was last seen in 2018, she had very mild IIH and managed to be taken   off diamox dosing with directions to return in 6 months  however patent did   not follow up. She does live in [facility].     Currently not taking any caffeine.      INTERVAL HISTORY Last seen 9/29/21  Since starting Diamox, she notes resolution of headaches and pulsatile   tinnitus. Denies double vision, TVOs. She stopped doxycycline after last   visit.     She has lost 10 lbs with weight watchers and portion control.   Taking   Diamox 1000 mg twice a day without significant problem.     Exam:  Visual acuity with correction was 20/20 in the right eye and 20/20 in the   left eye. IOP was 16 in the right eye and 12 in the left eye.   Confrontational Visual fields were full in both eyes. Ocular motility was   full in all gaze directions. Color plates were 11/11 in the right eye and   11/11 in the left eye.  Pupils were equal, round and reactive to light   with no RAPD.     Dilated fundus exam: Improved that optic disc edema in the right eye and   trace edema in the left eye    Tests ordered and interpreted today:  OCT rNFL demonstrated a mean NFL thickness of 93 in the right eye and 82   in the left eye. Thickness profile demonstrated diffuse thickening in the   right eye and mild thickening in the left eye.  Retinal nerve fiber layer   thickness was significant improved in the right eye since last visit and   unchanged in the left eye    Octopus automated 30 degree visual field. Test was reliable..  Both visual   fields showed mild scattered points of depression but were mostly full.    Both visual fields were improved from September 2021.    Interim history and exam with me since last visit 12/1/2021     Since her last visit she has been taking 1000 mg twice a day of Diamox.  She reports 1 headache every several weeks.  She does continue to have pulsatile tinnitus.  Of note the pulsatile tinnitus did resolve in the past when her papilledema was also resolved.  She does note that her whooshing has improved significantly from the fall 2021 upon restarting  Diamox.    She is currently at around 229 to 230 pounds with a significant additional 10 pounds of weight loss since last visit.  At the term time of her recurrence her reported weight was 260 pounds.    OCT of the optic nerve head revealed the average retinal nerve fiber layer thickness in the right eye was down another 4  m in close to her jessika.  The left eye does not fluctuate much with her pseudotumor activity and is essentially stable in terms of retinal nerve fiber layer thickness.         Complete documentation of historical and exam elements from today's encounter can be found in the full encounter summary report (not reduplicated in this progress note).  I personally obtained the chief complaint(s) and history of present illness.  I confirmed and edited as necessary the review of systems, past medical/surgical history, family history, social history, and examination findings as documented by others; and I examined the patient myself.  I personally reviewed the relevant tests, images, and reports as documented above.  I formulated and edited as necessary the assessment and plan and discussed the findings and management plan with the patient and family     Rusty Hutchins MD

## 2022-05-15 ENCOUNTER — HEALTH MAINTENANCE LETTER (OUTPATIENT)
Age: 42
End: 2022-05-15

## 2022-09-11 ENCOUNTER — HEALTH MAINTENANCE LETTER (OUTPATIENT)
Age: 42
End: 2022-09-11

## 2022-10-13 ENCOUNTER — OFFICE VISIT (OUTPATIENT)
Dept: OPHTHALMOLOGY | Facility: CLINIC | Age: 42
End: 2022-10-13
Attending: OPHTHALMOLOGY
Payer: COMMERCIAL

## 2022-10-13 DIAGNOSIS — H47.10 PAPILLEDEMA: Primary | ICD-10-CM

## 2022-10-13 PROCEDURE — G0463 HOSPITAL OUTPT CLINIC VISIT: HCPCS | Mod: 25

## 2022-10-13 PROCEDURE — 92014 COMPRE OPH EXAM EST PT 1/>: CPT | Mod: GC | Performed by: OPHTHALMOLOGY

## 2022-10-13 PROCEDURE — 92133 CPTRZD OPH DX IMG PST SGM ON: CPT | Performed by: OPHTHALMOLOGY

## 2022-10-13 RX ORDER — ACETAZOLAMIDE 500 MG/1
500 CAPSULE, EXTENDED RELEASE ORAL DAILY
Qty: 150 CAPSULE | Refills: 1 | Status: SHIPPED | OUTPATIENT
Start: 2022-10-13

## 2022-10-13 ASSESSMENT — VISUAL ACUITY
CORRECTION_TYPE: GLASSES
OD_CC+: -1
OS_CC+: -1
METHOD: SNELLEN - LINEAR
OD_CC: 20/20
OS_CC: 20/20

## 2022-10-13 ASSESSMENT — CONF VISUAL FIELD
OD_SUPERIOR_TEMPORAL_RESTRICTION: 0
OS_INFERIOR_TEMPORAL_RESTRICTION: 0
OD_INFERIOR_TEMPORAL_RESTRICTION: 0
OD_SUPERIOR_NASAL_RESTRICTION: 0
OS_SUPERIOR_TEMPORAL_RESTRICTION: 0
OD_NORMAL: 1
OS_INFERIOR_NASAL_RESTRICTION: 0
OS_NORMAL: 1
OS_SUPERIOR_NASAL_RESTRICTION: 0
OD_INFERIOR_NASAL_RESTRICTION: 0

## 2022-10-13 ASSESSMENT — EXTERNAL EXAM - LEFT EYE: OS_EXAM: NORMAL

## 2022-10-13 ASSESSMENT — TONOMETRY
OD_IOP_MMHG: 16
IOP_METHOD: TONOPEN
OS_IOP_MMHG: 16

## 2022-10-13 ASSESSMENT — EXTERNAL EXAM - RIGHT EYE: OD_EXAM: NORMAL

## 2022-10-13 ASSESSMENT — SLIT LAMP EXAM - LIDS
COMMENTS: NORMAL
COMMENTS: NORMAL

## 2022-10-13 NOTE — PROGRESS NOTES
1. Pseudotumor cerebri - Recurrence of IIH noted in 9/2021 likely from a combination of weight gain and tetracycline derivative antibiotic use. She has noticed subjective improvement in her IIH related symptoms including headaches and pulsatile tinnitus over the course of her disease; today these remain essentially stable. Tolerating Diamox well at current dose of 500 mg BID.      Current weight is 240 lbs down from 260 lbs at diagnosis. OCT RNFL remains stable and near jessika.      Plan:  Decrease Diamox from 500 mg from twice a day to daily, return in 4 months and consider discontinuation if remains stable.        Historical Data from prior visits:  Cassia Mckeon is back today with recurrence of symptoms concerning for   IIH.  She has had recurrence of her pulsatile tinnitus that she first noticed   this summer. She notices this every once and a while. The frequency and   intensity has been stable since recurrence. She also feels pressure in her   head, and this is also similar to before. Has also had recurrence of   headaches that also feel similar to her original presentation. These are   worse later on in the day. These are happening a few times a week.  These   headaches are improved with sleeping or napping. With regard to her   vision, she feels is seeing black circles in her vision when she goes up   stairs or with increased exertional activity.      With regard to identifying triggers, she has gained about 30lbs in the   past three years and for rosacea was started on Minocycline about 18   months ago. This she tried for about 2 weeks before she felt like her   pressure symptoms at that time were returning so she stopped and switched   to daily doxycycline 50 mg. She is still taking the daily doxycycline, she   feels that it is helping her flare ups, though has only had a mild   improvement in her skin redness.      She was last seen in 2018, she had very mild IIH and managed to be taken   off  diamox dosing with directions to return in 6 months however patent did   not follow up. She does live in [facility].      Currently not taking any caffeine.      Interim history and exam with me since last visit 3/9/22     No visual disturbance since last visit. Headaches seem about the same with pressure in the face. Stable R-sided pulsatile tinnitus. Has been on Diamox 500 mg BID. Gained 10 lbs since last visit.    Corrected distance visual acuity was 20/20 -1 in the right eye and 20/20 -1 in the left eye. Intraocular pressure was 16 in the right eye and 16 in the left eye using Tonopen.  Color vision 11/11 right eye and 11/11 left eye.  Pupils isocoric without RAPD.  Anterior segment exam unremarkable.  Fundus exam with mildly gliotic but non-edematous optic nerve heads bilaterally.     OCT of the optic nerve head revealed the average retinal nerve fiber layer thickness in the right eye of 91, essentially stable from recent testing with jessika 86 in 4/2018. The left eye does not fluctuate much with her pseudotumor activity and is essentially stable in terms of retinal nerve fiber layer thickness, but there remains borderline inferonasal thinning.            Complete documentation of historical and exam elements from today's encounter can be found in the full encounter summary report (not reduplicated in this progress note).  I personally obtained the chief complaint(s) and history of present illness.  I confirmed and edited as necessary the review of systems, past medical/surgical history, family history, social history, and examination findings as documented by others; and I examined the patient myself.  I personally reviewed the relevant tests, images, and reports as documented above.  I formulated and edited as necessary the assessment and plan and discussed the findings and management plan with the patient and family.  I personally reviewed the ophthalmic test(s) associated with this encounter, agree  with the interpretation(s) as documented by the resident/fellow, and have edited the corresponding report(s) as necessary.     MD Silvestre Escobar MD PhD  Fellow, Neuro-Ophthalmology

## 2023-05-03 ENCOUNTER — OFFICE VISIT (OUTPATIENT)
Dept: OPHTHALMOLOGY | Facility: CLINIC | Age: 43
End: 2023-05-03
Attending: OPHTHALMOLOGY
Payer: COMMERCIAL

## 2023-05-03 DIAGNOSIS — H47.10 PAPILLEDEMA: Primary | ICD-10-CM

## 2023-05-03 PROCEDURE — 92014 COMPRE OPH EXAM EST PT 1/>: CPT | Mod: GC | Performed by: OPHTHALMOLOGY

## 2023-05-03 PROCEDURE — 99213 OFFICE O/P EST LOW 20 MIN: CPT | Performed by: OPHTHALMOLOGY

## 2023-05-03 PROCEDURE — 92133 CPTRZD OPH DX IMG PST SGM ON: CPT | Performed by: OPHTHALMOLOGY

## 2023-05-03 ASSESSMENT — CONF VISUAL FIELD
OD_SUPERIOR_TEMPORAL_RESTRICTION: 0
METHOD: COUNTING FINGERS
OD_SUPERIOR_NASAL_RESTRICTION: 0
OD_INFERIOR_TEMPORAL_RESTRICTION: 0
OS_SUPERIOR_NASAL_RESTRICTION: 0
OS_INFERIOR_TEMPORAL_RESTRICTION: 0
OD_NORMAL: 1
OS_NORMAL: 1
OD_INFERIOR_NASAL_RESTRICTION: 0
OS_INFERIOR_NASAL_RESTRICTION: 0
OS_SUPERIOR_TEMPORAL_RESTRICTION: 0

## 2023-05-03 ASSESSMENT — TONOMETRY
IOP_METHOD: TONOPEN
OD_IOP_MMHG: 18
OS_IOP_MMHG: 18

## 2023-05-03 ASSESSMENT — VISUAL ACUITY
OD_CC: 20/20
OS_CC: 20/20
CORRECTION_TYPE: GLASSES
METHOD: SNELLEN - LINEAR

## 2023-05-03 ASSESSMENT — SLIT LAMP EXAM - LIDS
COMMENTS: NORMAL
COMMENTS: NORMAL

## 2023-05-03 ASSESSMENT — REFRACTION_WEARINGRX
SPECS_TYPE: SVL
OD_CYLINDER: +1.25
OS_AXIS: 100
OD_AXIS: 080
OD_SPHERE: -3.75
OS_CYLINDER: +2.50
OS_SPHERE: -5.50

## 2023-05-03 ASSESSMENT — EXTERNAL EXAM - RIGHT EYE: OD_EXAM: NORMAL

## 2023-05-03 ASSESSMENT — EXTERNAL EXAM - LEFT EYE: OS_EXAM: NORMAL

## 2023-05-03 NOTE — PROGRESS NOTES
1. Pseudotumor cerebri - Recurrence of IIH noted in 9/2021 likely from a combination of weight gain and tetracycline derivative antibiotic use. She has noticed subjective improvement in her IIH related symptoms including headaches and pulsatile tinnitus over the course of her disease; today these remain essentially stable. Tolerating Diamox well at current dose of 500 mg daily.      Current weight is 250 lbs down from 260 lbs at diagnosis (but up from 240 lb last time) OCT RNFL remains stable and near jessika. She is working with a weight loss clinic in [place]. She is on Wellbutrin to help with her weight loss and is considering starting topiramate or phenteramine.     Today we gave her the option to discontinue the diamox versus taper further to 250 mg tablet daily versus 500 mg capsule every other day. She elected to stop the Diamox and to monitor her symptoms for recurrent whooshing noise, vision change, or headaches.      Plan:  Discontinue Diamox, follow up in 4 months, sooner if worsening vision or headaches develop    Historical Data from prior visits:  Cassia Mckeon is back today with recurrence of symptoms concerning for   IIH. She has had recurrence of her pulsatile tinnitus that she first noticed   this summer. She notices this every once and a while. The frequency and   intensity has been stable since recurrence. She also feels pressure in her   head, and this is also similar to before. Has also had recurrence of   headaches that also feel similar to her original presentation. These are   worse later on in the day. These are happening a few times a week.  These   headaches are improved with sleeping or napping. With regard to her   vision, she feels is seeing black circles in her vision when she goes up   stairs or with increased exertional activity.      With regard to identifying triggers, she has gained about 30lbs in the   past three years and for rosacea was started on Minocycline about 18    months ago. This she tried for about 2 weeks before she felt like her   pressure symptoms at that time were returning so she stopped and switched   to daily doxycycline 50 mg. She is still taking the daily doxycycline, she   feels that it is helping her flare ups, though has only had a mild   improvement in her skin redness.      Currently not taking any caffeine.      Interim history and exam with me since last visit 10/13/22     No visual disturbance since last visit. Headaches seem about the same with pressure in the face. Stable R-sided pulsatile tinnitus. Has been on Diamox 500 mg daily. Gained some weight since last visit.  She is currently up about 10 pounds but still down from her recent recurrence weight     Corrected distance visual acuity was 20/20 in the right eye and 20/20 in the left eye. Intraocular pressure was 18 in the right eye and 18 in the left eye using Tonopen.  Color vision 11/11 right eye and 11/11 left eye.  Pupils equal no APD.  Anterior segment exam normal.  Fundus exam chronic remodeling/elevation of the disc bilaterally    OCT RNFL 5/3/23:  Right eye - mild infratemporal thinning, stable  Left eye - nasal and inferior thinning, stable         Geovanna Carbajal MD  Ophthalmology Resident, PGY-3  [facility]    Complete documentation of historical and exam elements from today's encounter can be found in the full encounter summary report (not reduplicated in this progress note).  I personally obtained the chief complaint(s) and history of present illness.  I confirmed and edited as necessary the review of systems, past medical/surgical history, family history, social history, and examination findings as documented by others; and I examined the patient myself.  I personally reviewed the relevant tests, images, and reports as documented above.  I formulated and edited as necessary the assessment and plan and discussed the findings and management plan with the patient and family.   I personally reviewed the ophthalmic test(s) associated with this encounter, agree with the interpretation(s) as documented by the resident/fellow, and have edited the corresponding report(s) as necessary.     Rusty Hutchins MD

## 2023-05-03 NOTE — NURSING NOTE
Chief Complaints and History of Present Illnesses   Patient presents with     Papilledema Follow Up     Chief Complaint(s) and History of Present Illness(es)     Papilledema Follow Up            Laterality: both eyes    Onset: gradual    Onset: 4 months ago    Associated symptoms: Negative for eye pain, flashes and floaters    Treatments tried: no treatments    Pain scale: 0/10          Comments    Pt states her vision has been stable since last visit.     Ocular meds:  Diamox 500 mg qday     SINCERE LONG May 3, 2023 12:53 PM

## 2023-08-14 ENCOUNTER — MYC MEDICAL ADVICE (OUTPATIENT)
Dept: OPHTHALMOLOGY | Facility: CLINIC | Age: 43
End: 2023-08-14
Payer: COMMERCIAL

## 2023-08-14 DIAGNOSIS — H47.10 PAPILLEDEMA: Primary | ICD-10-CM

## 2023-08-14 RX ORDER — ACETAZOLAMIDE 500 MG/1
500 CAPSULE, EXTENDED RELEASE ORAL EVERY EVENING
Qty: 30 CAPSULE | Refills: 3 | Status: SHIPPED | OUTPATIENT
Start: 2023-08-14

## 2023-08-14 NOTE — TELEPHONE ENCOUNTER
Called patient back about increasing headaches and discussed to topiramate is also used to treat IIH and headaches so she can continue to take the topiramate or switch back to diamox. Patient mentioned preferring to go back on diamox. She is planning to discontinue to topiramate. A new prescription for diamox 500mg qpm was ordered.     Imtiaz New MD   Fellow, Neuro-Ophthalmology

## 2023-12-16 ENCOUNTER — HEALTH MAINTENANCE LETTER (OUTPATIENT)
Age: 43
End: 2023-12-16

## 2024-02-24 ENCOUNTER — HEALTH MAINTENANCE LETTER (OUTPATIENT)
Age: 44
End: 2024-02-24

## 2024-02-28 ENCOUNTER — OFFICE VISIT (OUTPATIENT)
Dept: OPHTHALMOLOGY | Facility: CLINIC | Age: 44
End: 2024-02-28
Attending: OPHTHALMOLOGY
Payer: COMMERCIAL

## 2024-02-28 DIAGNOSIS — H47.10 PAPILLEDEMA: Primary | ICD-10-CM

## 2024-02-28 DIAGNOSIS — H47.20 PARTIAL OPTIC ATROPHY: ICD-10-CM

## 2024-02-28 DIAGNOSIS — H53.40 VISUAL FIELD DEFECT: ICD-10-CM

## 2024-02-28 PROCEDURE — 92083 EXTENDED VISUAL FIELD XM: CPT | Performed by: OPHTHALMOLOGY

## 2024-02-28 PROCEDURE — 92133 CPTRZD OPH DX IMG PST SGM ON: CPT | Performed by: OPHTHALMOLOGY

## 2024-02-28 PROCEDURE — 92014 COMPRE OPH EXAM EST PT 1/>: CPT | Mod: GC | Performed by: OPHTHALMOLOGY

## 2024-02-28 PROCEDURE — 99214 OFFICE O/P EST MOD 30 MIN: CPT | Performed by: OPHTHALMOLOGY

## 2024-02-28 RX ORDER — METFORMIN HCL 500 MG
500 TABLET, EXTENDED RELEASE 24 HR ORAL
COMMUNITY
Start: 2024-02-21 | End: 2024-05-21

## 2024-02-28 RX ORDER — BUPROPION HYDROCHLORIDE 300 MG/1
300 TABLET ORAL
COMMUNITY
Start: 2024-01-03 | End: 2024-04-02

## 2024-02-28 RX ORDER — TOPIRAMATE 25 MG/1
3 TABLET, FILM COATED ORAL AT BEDTIME
COMMUNITY
Start: 2023-07-07 | End: 2024-05-21

## 2024-02-28 ASSESSMENT — REFRACTION_WEARINGRX
SPECS_TYPE: SVL
OS_CYLINDER: +2.50
OS_SPHERE: -5.50
OD_AXIS: 080
OS_AXIS: 100
OD_SPHERE: -3.75
OD_CYLINDER: +1.25

## 2024-02-28 ASSESSMENT — EXTERNAL EXAM - RIGHT EYE: OD_EXAM: NORMAL

## 2024-02-28 ASSESSMENT — CONF VISUAL FIELD
OS_NORMAL: 1
OD_INFERIOR_TEMPORAL_RESTRICTION: 0
OD_SUPERIOR_TEMPORAL_RESTRICTION: 0
OD_SUPERIOR_NASAL_RESTRICTION: 0
OD_INFERIOR_NASAL_RESTRICTION: 0
OS_INFERIOR_NASAL_RESTRICTION: 0
OS_SUPERIOR_NASAL_RESTRICTION: 0
OS_SUPERIOR_TEMPORAL_RESTRICTION: 0
METHOD: COUNTING FINGERS
OD_NORMAL: 1
OS_INFERIOR_TEMPORAL_RESTRICTION: 0

## 2024-02-28 ASSESSMENT — TONOMETRY
IOP_METHOD: ICARE
OD_IOP_MMHG: 16
OS_IOP_MMHG: 14

## 2024-02-28 ASSESSMENT — SLIT LAMP EXAM - LIDS
COMMENTS: NORMAL
COMMENTS: NORMAL

## 2024-02-28 ASSESSMENT — VISUAL ACUITY
METHOD: SNELLEN - LINEAR
OS_CC: 20/20
OD_CC: 20/20
CORRECTION_TYPE: GLASSES

## 2024-02-28 ASSESSMENT — EXTERNAL EXAM - LEFT EYE: OS_EXAM: NORMAL

## 2024-02-28 NOTE — LETTER
2024    RE: Cassia Mckeon  : 1980  MRN: [id number]    Dear Providers,    I saw our mutual patient, Cassia Mckeon, in follow-up in my clinic recently.  After a thorough neuro-ophthalmic history and examination, I came to the following conclusions:     1. Pseudotumor cerebri - Recurrence of IIH noted in 2021 likely from a combination of weight gain and tetracycline derivative antibiotic use. She was doing very well without subjective IIH symptoms and the diamox was stopped 2023.    She has not had any recurrence of IIH symptoms since being off the diamox. She continues to work with the weight management clinic to manage her weight and is down ~5 lbs since last visit.     Overall, her OCT RNFL and VF testing do not show any evidence of recurrence of active edema. We can continue to monitor off diamox.    Patient had symptoms of daily headaches and pulsatile tinnitus when she had active pseudotumor cerebri hence it is reasonable at this point to transfer her care entirely back to her primary eye care provider. If there is concern for recurrent pseudotumor cerebri then I'm happy to see her back again in the future any time. Her optic nerve heads will always have a slightly full and elevated appearance with nasal blurring of the margin due chronic remodeling from prior swelling but she does not have active swelling today.     Plan:  Continue off diamox    I did not make a follow-up appointment, but I would be happy to see the patient back in the future should any new neuro-ophthalmic concern arise.    Historical Data from prior visits:  Cassia Mckeon is back today with recurrence of symptoms concerning for IIH. She has had recurrence of her pulsatile tinnitus that she first noticed this summer. She notices this every once and a while. The frequency and intensity has been stable since recurrence. She also feels pressure in her   head, and this is also similar to before. Has also had  recurrence of headaches that also feel similar to her original presentation. These are worse later on in the day. These are happening a few times a week. These headaches are improved with sleeping or napping. With regard to her vision, she feels is seeing black circles in her vision when she goes up stairs or with increased exertional activity.      With regard to identifying triggers, she has gained about 30lbs in the past three years and for rosacea was started on Minocycline about 18 months ago. This she tried for about 2 weeks before she felt like her   pressure symptoms at that time were returning so she stopped and switched to daily doxycycline 50 mg. She is still taking the daily doxycycline, she feels that it is helping her flare ups, though has only had a mild   improvement in her skin redness.      Currently not taking any caffeine.      Interim history and exam with me since last visit 5/3/23     At last visit, we stopped her diamox with plans to follow up in 4 months; however, she was lost to follow up until today. Since her last visit, she reports she has been doing very well off the diamox. She has not had any recurrence of symptoms. Denies any headaches, pulsatile tinnitus, TOVs. Has continued to focus on her weight. She thinks she is down ~5 lbs since last year.    Corrected distance visual acuity was 20/20 in the right eye and 20/20 in the left eye. Intraocular pressure was 16 in the right eye and 14 in the left eye.  Color vision 11/11 right eye and 11/11 left eye.  Pupils equal no APD.  Anterior segment exam normal.  Fundus exam chronic remodeling/elevation of the disc bilaterally    OCT RNFL with stable thickness in both eyes today c/t previous    Octopus automated 30 degree visual field- essentially full right eye.    Nonspecific field defect left eye that appears worse than last visit however this field appears unreliable to me.      For further exam details, please feel free to contact our  office for additional records.  If you wish to contact me regarding this patient please email me at [email or web address] or give my clinic a call to arrange a phone conversation.    Sincerely,    Rusty Hutchins MD  , Neuro-Ophthalmology and Adult Strabismus Surgery  [facility] in Neuro-Ophthalmology  Department of Ophthalmology and Visual Neurosciences  [facility]

## 2024-02-28 NOTE — NURSING NOTE
Chief Complaint(s) and History of Present Illness(es)       Papilledema Follow Up    In both eyes.  Since onset it is stable.  Associated symptoms include headache.  Negative for double vision and eye pain.  Pain was noted as 0/10.             Comments    Overdue follow-up for IIH.  Patient was last seen by Dr. Hutchins 05/03/23 for recurrent IIH.  At that visit, she elected to stop the Diamox.  She called 08/14/23 for worsening headaches and was instructed by the neuro-ophth fellow to restart Diamox 500 mg at bedtime.  She did not restart the Diamox.  She met with her weight-loss specialists and realized that the headaches were contributed from her not eating lunch.  After a more consistent schedule of having lunch, the headaches stopped.  She only gets them about a few times per month.  She denies eye pain or tinnitus.  Current weight is 246 lbs. Currently on Topiramate 75 mg daily.  EMERALD Kendrick 2/28/2024 12:36 PM

## 2024-02-28 NOTE — PROGRESS NOTES
1. Pseudotumor cerebri - Recurrence of IIH noted in 9/2021 likely from a combination of weight gain and tetracycline derivative antibiotic use. She was doing very well without subjective IIH symptoms and the diamox was stopped 05/2023.    She has not had any recurrence of IIH symptoms since being off the diamox. She continues to work with the weight management clinic to manage her weight and is down ~5 lbs since last visit.     Overall, her OCT RNFL and VF testing do not show any evidence of recurrence of active edema. We can continue to monitor off diamox.    Patient had symptoms of daily headaches and pulsatile tinnitus when she had active pseudotumor cerebri hence it is reasonable at this point to transfer her care entirely back to her primary eye care provider. If there is concern for recurrent pseudotumor cerebri then I'm happy to see her back again in the future any time. Her optic nerve heads will always have a slightly full and elevated appearance with nasal blurring of the margin due chronic remodeling from prior swelling but she does not have active swelling today.     Plan:  Continue off diamox    I did not make a follow-up appointment, but I would be happy to see the patient back in the future should any new neuro-ophthalmic concern arise.      Historical Data from prior visits:  Cassia Mckeon is back today with recurrence of symptoms concerning for   IIH. She has had recurrence of her pulsatile tinnitus that she first noticed   this summer. She notices this every once and a while. The frequency and   intensity has been stable since recurrence. She also feels pressure in her   head, and this is also similar to before. Has also had recurrence of   headaches that also feel similar to her original presentation. These are   worse later on in the day. These are happening a few times a week.  These   headaches are improved with sleeping or napping. With regard to her   vision, she feels is seeing black  circles in her vision when she goes up   stairs or with increased exertional activity.      With regard to identifying triggers, she has gained about 30lbs in the   past three years and for rosacea was started on Minocycline about 18   months ago. This she tried for about 2 weeks before she felt like her   pressure symptoms at that time were returning so she stopped and switched   to daily doxycycline 50 mg. She is still taking the daily doxycycline, she   feels that it is helping her flare ups, though has only had a mild   improvement in her skin redness.      Currently not taking any caffeine.      Interim history and exam with me since last visit 5/3/23     At last visit, we stopped her diamox with plans to follow up in 4 months; however, she was lost to follow up until today. Since her last visit, she reports she has been doing very well off the diamox. She has not had any recurrence of symptoms. Denies any headaches, pulsatile tinnitus, TOVs. Has continued to focus on her weight. She thinks she is down ~5 lbs since last year.    Corrected distance visual acuity was 20/20 in the right eye and 20/20 in the left eye. Intraocular pressure was 16 in the right eye and 14 in the left eye.  Color vision 11/11 right eye and 11/11 left eye.  Pupils equal no APD.  Anterior segment exam normal.  Fundus exam chronic remodeling/elevation of the disc bilaterally    OCT RNFL with stable thickness in both eyes today c/t previous    Octopus automated 30 degree visual field- essentially full right eye.    Nonspecific field defect left eye that appears worse than last visit however this field appears unreliable to me.       Rachel Mo MD  Resident Physician, PGY-3  Department of Ophthalmology    Complete documentation of historical and exam elements from today's encounter can be found in the full encounter summary report (not reduplicated in this progress note).  I personally obtained the chief complaint(s) and history of  present illness.  I confirmed and edited as necessary the review of systems, past medical/surgical history, family history, social history, and examination findings as documented by others; and I examined the patient myself.  I personally reviewed the relevant tests, images, and reports as documented above.  I formulated and edited as necessary the assessment and plan and discussed the findings and management plan with the patient and family.  I personally reviewed the ophthalmic test(s) associated with this encounter, agree with the interpretation(s) as documented by the resident/fellow, and have edited the corresponding report(s) as necessary.     Rusty Hutchins MD

## 2024-11-24 ENCOUNTER — HEALTH MAINTENANCE LETTER (OUTPATIENT)
Age: 44
End: 2024-11-24

## 2025-08-30 ENCOUNTER — HEALTH MAINTENANCE LETTER (OUTPATIENT)
Age: 45
End: 2025-08-30